# Patient Record
Sex: FEMALE | Race: WHITE | NOT HISPANIC OR LATINO | Employment: OTHER | URBAN - METROPOLITAN AREA
[De-identification: names, ages, dates, MRNs, and addresses within clinical notes are randomized per-mention and may not be internally consistent; named-entity substitution may affect disease eponyms.]

---

## 2023-11-12 ENCOUNTER — HOSPITAL ENCOUNTER (EMERGENCY)
Facility: HOSPITAL | Age: 88
Discharge: NURSING, REHAB OR LONG-TERM CARE | End: 2023-11-12
Attending: EMERGENCY MEDICINE | Admitting: EMERGENCY MEDICINE
Payer: MEDICARE

## 2023-11-12 ENCOUNTER — APPOINTMENT (OUTPATIENT)
Dept: CT IMAGING | Facility: HOSPITAL | Age: 88
End: 2023-11-12
Payer: MEDICARE

## 2023-11-12 ENCOUNTER — APPOINTMENT (OUTPATIENT)
Dept: GENERAL RADIOLOGY | Facility: HOSPITAL | Age: 88
End: 2023-11-12
Payer: MEDICARE

## 2023-11-12 VITALS
TEMPERATURE: 98.1 F | DIASTOLIC BLOOD PRESSURE: 83 MMHG | HEIGHT: 66 IN | RESPIRATION RATE: 18 BRPM | HEART RATE: 59 BPM | WEIGHT: 125 LBS | BODY MASS INDEX: 20.09 KG/M2 | SYSTOLIC BLOOD PRESSURE: 186 MMHG | OXYGEN SATURATION: 97 %

## 2023-11-12 DIAGNOSIS — S82.141A CLOSED FRACTURE OF RIGHT TIBIAL PLATEAU, INITIAL ENCOUNTER: Primary | ICD-10-CM

## 2023-11-12 LAB
ANION GAP SERPL CALCULATED.3IONS-SCNC: 8 MMOL/L (ref 5–15)
BASOPHILS # BLD AUTO: 0.04 10*3/MM3 (ref 0–0.2)
BASOPHILS NFR BLD AUTO: 0.7 % (ref 0–1.5)
BUN SERPL-MCNC: 12 MG/DL (ref 8–23)
BUN/CREAT SERPL: 21.1 (ref 7–25)
CALCIUM SPEC-SCNC: 8.5 MG/DL (ref 8.2–9.6)
CHLORIDE SERPL-SCNC: 107 MMOL/L (ref 98–107)
CO2 SERPL-SCNC: 27 MMOL/L (ref 22–29)
CREAT SERPL-MCNC: 0.57 MG/DL (ref 0.57–1)
DEPRECATED RDW RBC AUTO: 51.3 FL (ref 37–54)
EGFRCR SERPLBLD CKD-EPI 2021: 84.9 ML/MIN/1.73
EOSINOPHIL # BLD AUTO: 0.13 10*3/MM3 (ref 0–0.4)
EOSINOPHIL NFR BLD AUTO: 2.2 % (ref 0.3–6.2)
ERYTHROCYTE [DISTWIDTH] IN BLOOD BY AUTOMATED COUNT: 14.6 % (ref 12.3–15.4)
GLUCOSE SERPL-MCNC: 108 MG/DL (ref 65–99)
HCT VFR BLD AUTO: 37.6 % (ref 34–46.6)
HGB BLD-MCNC: 12.3 G/DL (ref 12–15.9)
IMM GRANULOCYTES # BLD AUTO: 0.01 10*3/MM3 (ref 0–0.05)
IMM GRANULOCYTES NFR BLD AUTO: 0.2 % (ref 0–0.5)
INR PPP: 1.38 (ref 0.89–1.12)
LYMPHOCYTES # BLD AUTO: 2.25 10*3/MM3 (ref 0.7–3.1)
LYMPHOCYTES NFR BLD AUTO: 37.4 % (ref 19.6–45.3)
MCH RBC QN AUTO: 31.5 PG (ref 26.6–33)
MCHC RBC AUTO-ENTMCNC: 32.7 G/DL (ref 31.5–35.7)
MCV RBC AUTO: 96.4 FL (ref 79–97)
MONOCYTES # BLD AUTO: 0.47 10*3/MM3 (ref 0.1–0.9)
MONOCYTES NFR BLD AUTO: 7.8 % (ref 5–12)
NEUTROPHILS NFR BLD AUTO: 3.11 10*3/MM3 (ref 1.7–7)
NEUTROPHILS NFR BLD AUTO: 51.7 % (ref 42.7–76)
NRBC BLD AUTO-RTO: 0 /100 WBC (ref 0–0.2)
PLATELET # BLD AUTO: 249 10*3/MM3 (ref 140–450)
PMV BLD AUTO: 10 FL (ref 6–12)
POTASSIUM SERPL-SCNC: 3.6 MMOL/L (ref 3.5–5.2)
PROTHROMBIN TIME: 17.1 SECONDS (ref 12.2–14.5)
RBC # BLD AUTO: 3.9 10*6/MM3 (ref 3.77–5.28)
SODIUM SERPL-SCNC: 142 MMOL/L (ref 136–145)
WBC NRBC COR # BLD: 6.01 10*3/MM3 (ref 3.4–10.8)

## 2023-11-12 PROCEDURE — 99284 EMERGENCY DEPT VISIT MOD MDM: CPT

## 2023-11-12 PROCEDURE — 36415 COLL VENOUS BLD VENIPUNCTURE: CPT

## 2023-11-12 PROCEDURE — 85025 COMPLETE CBC W/AUTO DIFF WBC: CPT | Performed by: EMERGENCY MEDICINE

## 2023-11-12 PROCEDURE — 73560 X-RAY EXAM OF KNEE 1 OR 2: CPT

## 2023-11-12 PROCEDURE — 80048 BASIC METABOLIC PNL TOTAL CA: CPT | Performed by: EMERGENCY MEDICINE

## 2023-11-12 PROCEDURE — 73700 CT LOWER EXTREMITY W/O DYE: CPT

## 2023-11-12 PROCEDURE — 73502 X-RAY EXAM HIP UNI 2-3 VIEWS: CPT

## 2023-11-12 PROCEDURE — 85610 PROTHROMBIN TIME: CPT | Performed by: EMERGENCY MEDICINE

## 2023-11-12 RX ORDER — HYDROCODONE BITARTRATE AND ACETAMINOPHEN 5; 325 MG/1; MG/1
1 TABLET ORAL ONCE
Status: COMPLETED | OUTPATIENT
Start: 2023-11-12 | End: 2023-11-12

## 2023-11-12 RX ADMIN — HYDROCODONE BITARTRATE AND ACETAMINOPHEN 1 TABLET: 5; 325 TABLET ORAL at 15:05

## 2023-11-12 NOTE — ED PROVIDER NOTES
Subjective   History of Present Illness    Pt presents after a fall.  She was trying to help her  out of bed and they both fell down.  [place] [facility] [place]  She says she isn't supposed to help him but they called for assistance and nobody came so she tried to help him up since he'd try to get up on his own.    She has pain to the right knee and milder pain to the right hip since falling. She denies hitting her head and denies pain to the head, neck, back, torso or arms.  She is anticoagulated.    She denies having any dizziness or acute illness symptoms leading to her fall.    History provided by:  Patient      Review of Systems   Constitutional:  Negative for fever.   Neurological:  Negative for dizziness and weakness.   All other systems reviewed and are negative.      Past Medical History:   Diagnosis Date    Atrial fibrillation     Hypertension     Pulmonary embolism        No Known Allergies    No past surgical history on file.    No family history on file.    Social History     Socioeconomic History    Marital status:    Tobacco Use    Smoking status: Former     Types: Cigarettes     Quit date:      Years since quittin.8   Substance and Sexual Activity    Alcohol use: No    Drug use: No           Objective   Physical Exam  Vitals and nursing note reviewed.   Constitutional:       General: She is not in acute distress.     Appearance: Normal appearance. She is not ill-appearing.   HENT:      Head: Normocephalic and atraumatic.   Eyes:      General: No scleral icterus.        Right eye: No discharge.         Left eye: No discharge.      Conjunctiva/sclera: Conjunctivae normal.   Cardiovascular:      Rate and Rhythm: Normal rate.   Pulmonary:      Effort: Pulmonary effort is normal. No respiratory distress.   Musculoskeletal:         General: Swelling present. No deformity.      Cervical back: Normal range of motion and neck supple. No tenderness.      Comments: There is a  hematoma on the right leg just inferior to the knee.  Very tender.  Knee ROM is limited by pain but she can resist gravity.  The lower leg is normal otherwise with good color and intact distal pulses and normal sensation and motor in the toes.    There is mild anterior hip tenderness.  ROM not tested pending imaging.    Except as documented there is no tenderness to gross palpation of the arms or legs, and intact painless ROM to the shoulders, elbows, wrists, hips, knees and ankles.   Skin:     General: Skin is dry.      Findings: No rash.   Neurological:      General: No focal deficit present.      Mental Status: She is alert and oriented to person, place, and time. Mental status is at baseline.   Psychiatric:         Mood and Affect: Mood normal.         Behavior: Behavior normal.         Thought Content: Thought content normal.         Procedures           ED Course    I reviewed the nursing home records sent with her with an XR report declaring an avulsion fracture of the proximal tibia.    XR here by my read shows a tibial plateau fracture.  Radiology report is equivocal.  CT ordered which confirms fracture.    I discussed findings and history with ortho Dr Prado.    Pt at baseline lives in NH with her  and is already wheelchair dependent.  She is anticoagulated.  Surgery would be a risk for her.  For today we will place a knee immobilizer and she will see ortho in the office.    She says she has pain meds prn at the NH and declined rx here.    Patient stable on serial rechecks.  Discussed findings, concerns, plan of care, expected course, reasons to return and followup.  Provided the opportunity to ask questions.                                         Medical Decision Making  Problems Addressed:  Closed fracture of right tibial plateau, initial encounter: complicated acute illness or injury    Amount and/or Complexity of Data Reviewed  External Data Reviewed: notes.     Details: Nursing home  Labs:  ordered. Decision-making details documented in ED Course.  Radiology: ordered and independent interpretation performed. Decision-making details documented in ED Course.     Details: XR my read: tibial plateau fracture    Risk  Prescription drug management.  Decision regarding hospitalization.        Final diagnoses:   Closed fracture of right tibial plateau, initial encounter       ED Disposition  ED Disposition       ED Disposition   Discharge    Condition   Stable    Comment   --               Navarro Prado MD  [address] [address]  [phone]    Call in 1 day           Medication List      No changes were made to your prescriptions during this visit.            Elmer Dumont MD  11/12/23 5939

## 2023-11-16 ENCOUNTER — OFFICE VISIT (OUTPATIENT)
Dept: ORTHOPEDIC SURGERY | Facility: CLINIC | Age: 88
End: 2023-11-16
Payer: MEDICARE

## 2023-11-16 DIAGNOSIS — S82.131A CLOSED FRACTURE OF MEDIAL PORTION OF RIGHT TIBIAL PLATEAU, INITIAL ENCOUNTER: Primary | ICD-10-CM

## 2023-11-16 RX ORDER — PSEUDOEPHEDRINE HCL 30 MG
TABLET ORAL
COMMUNITY

## 2023-11-16 RX ORDER — ONDANSETRON 4 MG/1
4 TABLET, FILM COATED ORAL
COMMUNITY

## 2023-11-16 RX ORDER — LIFITEGRAST 50 MG/ML
1 SOLUTION/ DROPS OPHTHALMIC 2 TIMES DAILY
COMMUNITY
Start: 2023-10-31

## 2023-11-16 RX ORDER — RIVAROXABAN 20 MG/1
20 TABLET, FILM COATED ORAL
COMMUNITY

## 2023-11-16 RX ORDER — BACLOFEN 5 MG/1
5 TABLET ORAL
COMMUNITY
Start: 2023-11-13

## 2023-11-16 RX ORDER — OMEPRAZOLE 20 MG/1
20 CAPSULE, DELAYED RELEASE ORAL
COMMUNITY
Start: 2023-10-31

## 2023-11-16 RX ORDER — SERTRALINE HYDROCHLORIDE 100 MG/1
100 TABLET, FILM COATED ORAL
COMMUNITY
Start: 2023-11-03

## 2023-11-16 RX ORDER — FLUTICASONE PROPIONATE 50 MCG
SPRAY, SUSPENSION (ML) NASAL
COMMUNITY
Start: 2023-11-08

## 2023-11-16 RX ORDER — NITROGLYCERIN 0.4 MG/1
0.4 TABLET SUBLINGUAL
COMMUNITY

## 2023-11-16 RX ORDER — NYSTATIN 100000 [USP'U]/G
POWDER TOPICAL
COMMUNITY
Start: 2023-10-24

## 2023-11-16 RX ORDER — CLONIDINE HYDROCHLORIDE 0.1 MG/1
0.1 TABLET ORAL
COMMUNITY
Start: 2023-10-16

## 2023-11-16 RX ORDER — OXYCODONE HYDROCHLORIDE 5 MG/1
5 TABLET ORAL
COMMUNITY
Start: 2023-10-17

## 2023-11-16 RX ORDER — TRAZODONE HYDROCHLORIDE 50 MG/1
50 TABLET ORAL
COMMUNITY
Start: 2023-11-15

## 2023-11-16 NOTE — PROGRESS NOTES
[facility] Note        Subjective     Pain of the Right Knee      HPI    Lisa Green is a 89 y.o. female.  Patient is here with her daughter today for evaluation of her right knee.  She has had a hip fracture in the past.  She essentially is bed to chair and chair to bed and spends most of her time in a wheelchair.  She hit a another wheelchair and fell.  She is here for evaluation treatment after going to the [facility] ER.          Past Medical History:   Diagnosis Date    Atrial fibrillation     Fracture, tibia and fibula     Hypertension     Pulmonary embolism       History reviewed. No pertinent surgical history.   History reviewed. No pertinent family history.  Social History     Socioeconomic History    Marital status:    Tobacco Use    Smoking status: Former     Types: Cigarettes     Quit date:      Years since quittin.8   Vaping Use    Vaping Use: Never used   Substance and Sexual Activity    Alcohol use: No    Drug use: No      Current Outpatient Medications on File Prior to Visit   Medication Sig Dispense Refill    amLODIPine (NORVASC) 2.5 MG tablet Take 1 tablet by mouth Daily.      Baclofen (LIORESAL) 5 MG tablet 1 tablet.      cloNIDine (CATAPRES) 0.1 MG tablet 1 tablet.      docusate sodium 250 MG capsule Take 1 capsule every day by oral route.      fluticasone (FLONASE) 50 MCG/ACT nasal spray       nitroglycerin (NITROSTAT) 0.4 MG SL tablet Place 1 tablet under the tongue Every 5 (Five) Minutes As Needed for Chest Pain. Take no more than 3 doses in 15 minutes.      nystatin (MYCOSTATIN) 191800 UNIT/GM powder       omeprazole (priLOSEC) 20 MG capsule 1 capsule.      ondansetron (Zofran) 4 MG tablet 1 tablet.      oxyCODONE (ROXICODONE) 5 MG immediate release tablet 1 tablet.      sertraline (ZOLOFT) 100 MG tablet 1 tablet.      traZODone (DESYREL) 50 MG tablet 1 tablet.      Xarelto 20 MG tablet 1 tablet.      Xiidra 5 % ophthalmic solution Administer 1 drop  to both eyes 2 (Two) Times a Day.      [DISCONTINUED] omeprazole (PriLOSEC) 40 MG capsule Take 40 mg by mouth Daily.      [DISCONTINUED] warfarin (COUMADIN) 1 MG tablet Take 1 mg by mouth 4 (Four) Times a Week.      [DISCONTINUED] warfarin (COUMADIN) 3 MG tablet Take 3 mg by mouth 3 (Three) Times a Week.       No current facility-administered medications on file prior to visit.      No Known Allergies       Review of Systems   Constitutional: Negative.    HENT: Negative.     Eyes: Negative.    Respiratory: Negative.     Cardiovascular: Negative.    Gastrointestinal: Negative.    Endocrine: Negative.    Genitourinary: Negative.    Musculoskeletal:  Positive for arthralgias.   Skin: Negative.    Allergic/Immunologic: Negative.    Neurological: Negative.    Hematological: Negative.    Psychiatric/Behavioral: Negative.          I reviewed the patient's chief complaint, history of present illness, review of systems, past medical history, surgical history, family history, social history, medications and allergy list.        Objective      Physical Exam  There were no vitals taken for this visit.    There is no height or weight on file to calculate BMI.    General  Mental Status - alert  General Appearance - cooperative, well groomed, not in acute distress  Orientation - Oriented X3  Build & Nutrition - well developed and well nourished  Posture - normal posture  Gait -wheelchair       Ortho Exam  Patient is able to do straight leg raise.  There is a bit of a flexion contracture.  Patient does not passively get fully straight either.  There is ecchymosis at the medial tibial plateau.  She is tender to palpation along the medial tibia.  The knee is grossly stable to varus and valgus force at 0 and 30 degrees.  Pain with testing.    Imaging/Studies  Imaging Results (Last 24 Hours)       ** No results found for the last 24 hours. **          CT Lower Extremity Right Without Contrast  Narrative: CT LOWER EXTREMITY RIGHT WO  CONTRAST    Date of Exam: 11/12/2023 1:29 PM EST    Indication: right knee - patella and proximal tibia injuries.    Comparison: Right knee radiographs from the same day    Technique: Axial CT images were obtained of the right lower extremity without contrast administration.  Reconstructed coronal and sagittal images were also obtained. Automated exposure control and iterative construction methods were used.    Findings:  Bones are demineralized. There is a minimally displaced fracture of the proximal tibia involving the medial tibial plateau with articular step-off deformity measuring up to 3 mm. There are moderate osteoarthritic changes of the knee. Moderate   lipohemarthrosis is present. Soft tissue edema is present. Meniscal chondrocalcinosis is noted.  Impression: Impression:  1.Proximal tibial fracture involving the medial tibial plateau.  2.Moderate lipohemarthrosis.  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  4.Meniscal chondrocalcinosis, suggesting CPPD deposition disease.    Electronically Signed: Rigoberto Hernandes MD    11/12/2023 1:50 PM EST    Workstation ID: [id number]  XR Hip With or Without Pelvis 2 - 3 View Right  Narrative: XR HIP W OR WO PELVIS 2-3 VIEW RIGHT    Date of Exam: 11/12/2023 12:52 PM EST    Indication: hip pain after fall    Comparison: None available.    Findings:  The patient had a left hip arthroplasty. There is some narrowing of the right hip joint. There is osseous demineralization. There is no acute abnormality of the right hip. The pelvic bones seem intact.  Impression: Impression:  1.Osseous demineralization.  2.An acute right hip abnormality is not apparent.    Electronically Signed: Enoc Schwab MD    11/12/2023 1:12 PM EST    Workstation ID: [id number]  XR Knee 1 or 2 View Right  Narrative: XR KNEE 1 OR 2 VW RIGHT    Date of Exam: 11/12/2023 12:52 PM EST    Indication: right knee pain after fall    Comparison: None available.    Findings:  There is a  suprapatellar bursal joint effusion. There is chondrocalcinosis. There is limited deformity of the medial malleolar area. This may be more chronic and degenerative in nature. It is hard to appreciate a fracture line. There is osseous   demineralization. There is an enthesophyte along the superior pole patella.  Impression: Impression:  1.Suprapatellar bursal joint effusion suggested.  2.Osseous demineralization.  3.Chondrocalcinosis.  4.Slight deformity of the medial malleolus area that may be more chronic. The need for any additional work-up should be based on clinical findings.    Electronically Signed: Enoc Schwab MD    11/12/2023 1:09 PM EST    Workstation ID: [id number]       We have reviewed images and report of the x-ray above and also a CT scan done on the same day as a plain film.  Patient appears to have a mildly displaced medial tibial plateau fracture.  Quadricep tendon grossly appears to be intact without any type of avulsion.    Assessment    Assessment:  1. Closed fracture of medial portion of right tibial plateau, initial encounter        Plan:  Continue over-the-counter medication as needed for discomfort  89-year-old minimal ambulator with history of falls who has sustained a ground-level fall and an acute medial tibial plateau fracture--plan will be for nonoperative treatment.  Do not see that this would benefit her in we would not allow earlier weightbearing.  She is a minimal ambulator at her baseline.  We will put her into a hinged T ROM brace.  She can unlock the brace for sitting but she needs to lock the brace in full extension for transfers only.  See her back in 2 weeks with 2 views of her right knee.  We will allow more weightbearing once we see radiographic evidence of healing.        Navarro Prado MD  11/16/23  13:45 EST      Dictated Utilizing Dragon Dictation.

## 2023-11-30 ENCOUNTER — OFFICE VISIT (OUTPATIENT)
Dept: ORTHOPEDIC SURGERY | Facility: CLINIC | Age: 88
End: 2023-11-30
Payer: MEDICARE

## 2023-11-30 VITALS
BODY MASS INDEX: 20.09 KG/M2 | HEIGHT: 66 IN | SYSTOLIC BLOOD PRESSURE: 132 MMHG | WEIGHT: 125 LBS | DIASTOLIC BLOOD PRESSURE: 68 MMHG

## 2023-11-30 DIAGNOSIS — S82.131D CLOSED FRACTURE OF MEDIAL PORTION OF RIGHT TIBIAL PLATEAU WITH ROUTINE HEALING, SUBSEQUENT ENCOUNTER: Primary | ICD-10-CM

## 2023-11-30 RX ORDER — PROPRANOLOL HYDROCHLORIDE 80 MG/1
CAPSULE, EXTENDED RELEASE ORAL DAILY
COMMUNITY

## 2023-11-30 NOTE — PROGRESS NOTES
"    [facility] [place] [place]        Subjective     CC: Follow-up (2 week follow up -- Closed fracture of medial portion of right tibial plateau)      LEXI Green is a 89 y.o. female.  Patient returns with her daughter today for follow-up of her right medial tibial plateau fracture.  She was placed in a hinged T ROM brace at her last visit but tells me that she took it off because she could not tolerate it and caused her leg to bruise.  Patient tells me that she is able to sit without difficulty or discomfort.    Overall, patient's symptoms are as above.  Patient's daughter tells me in confidence that the patient has been more confused as of late.  She is taking pain medication for her injury.  Has a history of UTIs.    ROS:    Constiutional:Pt denies fever, chills, nausea, or vomiting.  MSK:as above        Objective      Past Medical History  Past Medical History:   Diagnosis Date    Atrial fibrillation     Fracture, tibia and fibula     Hypertension     Pulmonary embolism      Social History     Socioeconomic History    Marital status:    Tobacco Use    Smoking status: Former     Types: Cigarettes     Quit date:      Years since quittin.9   Vaping Use    Vaping Use: Never used   Substance and Sexual Activity    Alcohol use: No    Drug use: No          Physical Exam  /68   Ht 167.6 cm (66\")   Wt 56.7 kg (125 lb)   BMI 20.18 kg/m²     Body mass index is 20.18 kg/m².    Patient is well nourished and well developed.        Ortho Exam  Range of motion is 20 to about 95 degrees.  Mild medial joint line tenderness.  No gross instability to varus or valgus force at 30 degrees.    Imaging/Labs/EMG Reviewed:  Imaging Results (Last 24 Hours)       Procedure Component Value Units Date/Time    XR Knee 1 or 2 View Right [[id number]] Resulted: 23 1500     Updated: 23 1501    Narrative:      Knee X-Ray    Indication: Pain    Study:   AP and Lateral  views of Right " knee(s)    Comparison: Right knee 11/12/2023    Findings:  When compared to the prior study, there has been a small amount of   collapse of the medial tibial plateau fracture.  Overall alignment is   still acceptable.  Patient appears to have minimal degenerative changes noted in the medial,   lateral, and patellofemoral compartments.   Normal soft tissues  Minimal knee effusion noted  No bony lesions  Normal alignment     Impression:   Mildly displaced medial tibial plateau fracture with mild interval   displacement.                Assessment    Assessment:  1. Closed fracture of medial portion of right tibial plateau with routine healing, subsequent encounter        Plan:  Recommend over the counter anti-inflammatories for pain and/or swelling  Medial tibial plateau fracture right knee--patient is taking her brace off.  We will ask her physical therapist at [facility] to work on range of motion so when she has healed sufficiently to the point where we would feel confident letting her bear weight, she can do so without a significant flexion contracture of the right knee.  See her back in 2 to 3 weeks with an x-ray of her knee and we will reassess range of motion of the right knee.      Navarro Prado MD  11/30/23  16:33 EST      Dictated Utilizing Dragon Dictation.

## 2023-12-14 ENCOUNTER — OFFICE VISIT (OUTPATIENT)
Dept: ORTHOPEDIC SURGERY | Facility: CLINIC | Age: 88
End: 2023-12-14
Payer: MEDICARE

## 2023-12-14 VITALS
WEIGHT: 123 LBS | DIASTOLIC BLOOD PRESSURE: 68 MMHG | BODY MASS INDEX: 19.77 KG/M2 | SYSTOLIC BLOOD PRESSURE: 120 MMHG | HEIGHT: 66 IN

## 2023-12-14 DIAGNOSIS — S82.131D CLOSED FRACTURE OF MEDIAL PORTION OF RIGHT TIBIAL PLATEAU WITH ROUTINE HEALING, SUBSEQUENT ENCOUNTER: ICD-10-CM

## 2023-12-14 NOTE — PROGRESS NOTES
"    [facility] [place] [facility] [place]        Subjective     CC: Follow-up (2 week f/u; Closed fracture of medial portion of right tibial plateau with routine healing)      LEXI Green is a 89 y.o. female.  Patient returns today for follow-up of right knee medial tibial plateau fracture.  No new complaints or issues.    Pain scale: 5/10.  Only time she is attempting to weight-bear as with transfers. TROM--not wearing due to discomfort.  She had been offered a hinged knee brace at last visit and chose not to wear it either.  She is working with physical therapy at [facility].    Overall, patient's symptoms are as above.    ROS:    Constiutional:Pt denies fever, chills, nausea, or vomiting.  MSK:as above        Objective      Past Medical History  Past Medical History:   Diagnosis Date    Atrial fibrillation     Fracture, tibia and fibula     Hypertension     Pulmonary embolism      Social History     Socioeconomic History    Marital status:    Tobacco Use    Smoking status: Former     Types: Cigarettes     Quit date:      Years since quittin.9   Vaping Use    Vaping Use: Never used   Substance and Sexual Activity    Alcohol use: No    Drug use: No          Physical Exam  /68   Ht 167.6 cm (66\")   Wt 55.8 kg (123 lb)   BMI 19.85 kg/m²     Body mass index is 19.85 kg/m².    Patient is well nourished and well developed.     Predominantly wheelchair-bound.      Ortho Exam  Right knee  Skin: Intact without any erythema, warmth, swelling or evidence of infection.  Motion: Remains 15/20 to 100°.  Tenderness: Minimal medial joint pain tenderness.  Passive range of motion does not cause any pain.  Straight leg raise: Intact.  Motor/sensory: Grossly intact L2-S1.       Imaging/Labs/EMG Reviewed:  Ordered right knee plain films.  Imaging read/interpreted by Dr. Prado.    Imaging Results (Last 24 Hours)       Procedure Component Value Units Date/Time    XR Knee 1 or 2 View " Right [[id number]] Resulted: 12/14/23 1506     Updated: 12/14/23 1507    Narrative:      Knee X-Ray    Indication: Pain    Study:   AP and Lateral  views of Right knee(s)    Comparison: Right knee 11/30/2023    Findings:  The displaced fracture of the medial tibial plateau is stable in   appearance.  New bone is visible.  Patient appears to have minimal degenerative changes noted in the medial,   lateral, and patellofemoral compartments.   Normal soft tissues  Minimal knee effusion noted  No bony lesions  Normal alignment     Impression:   Stable appearance right medial tibial plateau fracture with interval   healing.              Assessment:  1. Closed fracture of medial portion of right tibial plateau with routine healing, subsequent encounter        Plan:  Right knee medial tibial plateau fracture, stable and healing.  Reviewed imaging with the patient and daughter.  [place] [facility]  Recommend OTC pain medication as needed.  To remain nonweightbearing for the next 2 weeks.  After those 2 weeks may weight-bear for transfers but must use a walker.  Follow up in 6 weeks for repeat evaluation, will need repeat imaging of right knee.  Questions and concerns answered.      Sita Paulino PA-C  12/15/23  11:24 EST      Dictated Utilizing Dragon Dictation.

## 2024-01-25 ENCOUNTER — OFFICE VISIT (OUTPATIENT)
Dept: ORTHOPEDIC SURGERY | Facility: CLINIC | Age: 89
End: 2024-01-25
Payer: MEDICARE

## 2024-01-25 VITALS
WEIGHT: 123.02 LBS | HEIGHT: 66 IN | SYSTOLIC BLOOD PRESSURE: 128 MMHG | DIASTOLIC BLOOD PRESSURE: 60 MMHG | BODY MASS INDEX: 19.77 KG/M2

## 2024-01-25 DIAGNOSIS — S82.131D CLOSED FRACTURE OF MEDIAL PORTION OF RIGHT TIBIAL PLATEAU WITH ROUTINE HEALING, SUBSEQUENT ENCOUNTER: Primary | ICD-10-CM

## 2024-01-25 PROCEDURE — 99213 OFFICE O/P EST LOW 20 MIN: CPT | Performed by: PHYSICIAN ASSISTANT

## 2024-01-25 PROCEDURE — 1160F RVW MEDS BY RX/DR IN RCRD: CPT | Performed by: PHYSICIAN ASSISTANT

## 2024-01-25 PROCEDURE — 1159F MED LIST DOCD IN RCRD: CPT | Performed by: PHYSICIAN ASSISTANT

## 2024-01-25 NOTE — PROGRESS NOTES
"    [facility] [place] [facility] [place]        Subjective     CC: Follow-up (6 week follow up; Closed fracture of medial portion of right tibial plateau with routine healing (DOI: 23))      LEXI Green is a 89 y.o. female.  Patient returns today for follow-up evaluation right knee medial tibial plateau fracture, treated nonoperatively.  No new issues or concerns.      Pain scale max 3/10.  She is typically in a wheelchair but when she does ambulate/weight bear with walker.  She is been attending PT at [facility].    Overall, patient's symptoms are better.    ROS:    Constiutional:Pt denies fever, chills, nausea, or vomiting.  MSK:as above        Objective      Past Medical History  Past Medical History:   Diagnosis Date    Atrial fibrillation     Fracture, tibia and fibula     Hypertension     Pulmonary embolism      Social History     Socioeconomic History    Marital status:    Tobacco Use    Smoking status: Former     Types: Cigarettes     Quit date:      Years since quittin.1   Vaping Use    Vaping Use: Never used   Substance and Sexual Activity    Alcohol use: No    Drug use: No    Sexual activity: Defer          Physical Exam  /60   Ht 167.6 cm (65.98\")   Wt 55.8 kg (123 lb 0.3 oz)   BMI 19.87 kg/m²     Body mass index is 19.87 kg/m².    Patient is well nourished and well developed.        Ortho Exam  Right knee  Skin: Intact without any erythema, warmth, swelling or evidence of infection.  Motion: Remains °.  Tenderness: No significant pain on exam today.  Patient describes mild discomfort.  Passive range of motion does not cause any pain.  Straight leg raise: Intact.  Motor/sensory: Grossly intact L2-S1.       Imaging/Labs/EMG Reviewed:  Ordered right knee plain films.  Imaging read/interpreted by Dr. Prado.    Knee X-Ray     Indication: Pain     Study:   AP and Lateral  views of Right knee(s)     Comparison: Right knee 2023   "   Findings:  Compared to the prior study, patient appears to have consolidated the medial tibial plateau fracture.  Alignment is not grossly different compared to the prior study.  Patient appears to have minimal degenerative changes noted in the medial, lateral, and patellofemoral compartments.   Normal soft tissues  Minimal knee effusion noted  No bony lesions  Normal alignment      Impression:   Healed right medial tibial plateau fracture.      Assessment:  1. Closed fracture of medial portion of right tibial plateau with routine healing, subsequent encounter        Plan:  Right knee medial tibial plateau fracture, healed.  Reviewed imaging with the patient and daughter.  Continue with [place] at [facility].  Recommend OTC pain medication as needed.  Weightbearing as tolerated but needs to continue using a walker to assist with ambulation as needed.  Follow up as needed.  Questions and concerns answered.      Sita Paulino PA-C  01/30/24  11:52 EST      Dictated Utilizing Dragon Dictation.

## 2024-09-18 ENCOUNTER — HOSPITAL ENCOUNTER (EMERGENCY)
Facility: HOSPITAL | Age: 89
Discharge: HOME | End: 2024-09-18
Attending: EMERGENCY MEDICINE
Payer: MEDICARE

## 2024-09-18 ENCOUNTER — APPOINTMENT (OUTPATIENT)
Dept: GENERAL RADIOLOGY | Facility: HOSPITAL | Age: 89
End: 2024-09-18
Payer: MEDICARE

## 2024-09-18 VITALS
SYSTOLIC BLOOD PRESSURE: 180 MMHG | WEIGHT: 129.3 LBS | TEMPERATURE: 97.8 F | HEART RATE: 66 BPM | BODY MASS INDEX: 20.78 KG/M2 | RESPIRATION RATE: 18 BRPM | DIASTOLIC BLOOD PRESSURE: 87 MMHG | HEIGHT: 66 IN | OXYGEN SATURATION: 96 %
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
## 2024-09-18 DIAGNOSIS — M25.552 PAIN OF LEFT HIP: ICD-10-CM

## 2024-09-18 LAB
HOLD SPECIMEN: NORMAL
WHOLE BLOOD HOLD COAG: NORMAL
WHOLE BLOOD HOLD SPECIMEN: NORMAL

## 2024-09-18 PROCEDURE — 73502 X-RAY EXAM HIP UNI 2-3 VIEWS: CPT

## 2024-09-18 PROCEDURE — 99283 EMERGENCY DEPT VISIT LOW MDM: CPT

## 2024-11-02 ENCOUNTER — APPOINTMENT (OUTPATIENT)
Dept: GENERAL RADIOLOGY | Facility: HOSPITAL | Age: 89
End: 2024-11-02
Payer: MEDICARE

## 2024-11-02 ENCOUNTER — HOSPITAL ENCOUNTER (EMERGENCY)
Facility: HOSPITAL | Age: 89
Discharge: HOME | End: 2024-11-02
Attending: EMERGENCY MEDICINE
Payer: MEDICARE

## 2024-11-02 ENCOUNTER — APPOINTMENT (OUTPATIENT)
Dept: CT IMAGING | Facility: HOSPITAL | Age: 89
End: 2024-11-02
Payer: MEDICARE

## 2024-11-02 VITALS
TEMPERATURE: 97.5 F | OXYGEN SATURATION: 95 % | HEART RATE: 58 BPM | DIASTOLIC BLOOD PRESSURE: 71 MMHG | SYSTOLIC BLOOD PRESSURE: 128 MMHG | HEIGHT: 66 IN | WEIGHT: 129.41 LBS | RESPIRATION RATE: 20 BRPM | BODY MASS INDEX: 20.8 KG/M2

## 2024-11-02 DIAGNOSIS — S01.01XA SCALP LACERATION, INITIAL ENCOUNTER: ICD-10-CM

## 2024-11-02 DIAGNOSIS — M25.552 ACUTE HIP PAIN, LEFT: ICD-10-CM
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
## 2024-11-02 DIAGNOSIS — S00.03XA SCALP HEMATOMA, INITIAL ENCOUNTER: Primary | ICD-10-CM

## 2024-11-02 PROCEDURE — 25010000002 LIDOCAINE PF 1% 1 % SOLUTION: Performed by: EMERGENCY MEDICINE

## 2024-11-02 PROCEDURE — 70450 CT HEAD/BRAIN W/O DYE: CPT

## 2024-11-02 PROCEDURE — 90471 IMMUNIZATION ADMIN: CPT | Performed by: EMERGENCY MEDICINE

## 2024-11-02 PROCEDURE — 25010000002 TETANUS-DIPHTH-ACELL PERTUSSIS 5-2.5-18.5 LF-MCG/0.5 SUSPENSION PREFILLED SYRINGE: Performed by: EMERGENCY MEDICINE

## 2024-11-02 PROCEDURE — 72125 CT NECK SPINE W/O DYE: CPT

## 2024-11-02 PROCEDURE — 90715 TDAP VACCINE 7 YRS/> IM: CPT | Performed by: EMERGENCY MEDICINE

## 2024-11-02 PROCEDURE — 73502 X-RAY EXAM HIP UNI 2-3 VIEWS: CPT

## 2024-11-02 PROCEDURE — 99284 EMERGENCY DEPT VISIT MOD MDM: CPT

## 2024-11-02 RX ORDER — LIDOCAINE HYDROCHLORIDE 10 MG/ML
10 INJECTION, SOLUTION EPIDURAL; INFILTRATION; INTRACAUDAL; PERINEURAL ONCE
Status: COMPLETED | OUTPATIENT
Start: 2024-11-02 | End: 2024-11-02

## 2024-11-02 RX ADMIN — TETANUS TOXOID, REDUCED DIPHTHERIA TOXOID AND ACELLULAR PERTUSSIS VACCINE, ADSORBED 0.5 ML: 5; 2.5; 8; 8; 2.5 SUSPENSION INTRAMUSCULAR at 04:53

## 2024-11-02 RX ADMIN — LIDOCAINE HYDROCHLORIDE 10 ML: 10 INJECTION, SOLUTION EPIDURAL; INFILTRATION; INTRACAUDAL; PERINEURAL at 04:36

## 2024-11-02 NOTE — DISCHARGE INSTRUCTIONS
Use extra caution to avoid falls.  You will need to have your staples in your head removed after 7 days.  Follow-up with a primary care doctor or return to the emergency room to have this conducted.  Return to the ER as needed for new or worsening symptoms

## 2024-11-02 NOTE — ED PROVIDER NOTES
[facility]    EMERGENCY DEPARTMENT ENCOUNTER      Pt Name: Lisa Green  MRN: [id number]  YOB: 1930  Date of evaluation: 11/2/2024  Provider: Rod Lopez MD    CHIEF COMPLAINT       Chief Complaint   Patient presents with    Fall         HISTORY OF PRESENT ILLNESS   Lisa Green is a 89 y.o. female who presents to the emergency department for evaluation of injuries after a fall.  Was getting up to use the bathroom in the middle of the night, stumbled over something and went to the ground striking her head.  She also has a headache, bleeding from her posterior scalp, pain in her left hip.  No numbness or weakness.    REVIEW OF SYSTEMS     ROS:  A chief complaint appropriate review of systems was completed and is negative except as noted in the HPI.      PAST MEDICAL HISTORY     Past Medical History:   Diagnosis Date    Atrial fibrillation     Fracture, tibia and fibula     Hypertension     Pulmonary embolism          SURGICAL HISTORY     History reviewed. No pertinent surgical history.      CURRENT MEDICATIONS     No current facility-administered medications for this encounter.    Current Outpatient Medications:     amLODIPine (NORVASC) 2.5 MG tablet, Take 1 tablet by mouth Daily., Disp: , Rfl:     Baclofen (LIORESAL) 5 MG tablet, 1 tablet., Disp: , Rfl:     cloNIDine (CATAPRES) 0.1 MG tablet, 1 tablet., Disp: , Rfl:     docusate sodium 250 MG capsule, Take 1 capsule every day by oral route., Disp: , Rfl:     fluticasone (FLONASE) 50 MCG/ACT nasal spray, , Disp: , Rfl:     nitroglycerin (NITROSTAT) 0.4 MG SL tablet, Place 1 tablet under the tongue Every 5 (Five) Minutes As Needed for Chest Pain. Take no more than 3 doses in 15 minutes., Disp: , Rfl:     nystatin (MYCOSTATIN) 676478 UNIT/GM powder, , Disp: , Rfl:     omeprazole (priLOSEC) 20 MG capsule, 1 capsule., Disp: , Rfl:     ondansetron (Zofran) 4 MG tablet, 1 tablet., Disp: , Rfl:     oxyCODONE (ROXICODONE) 5 MG immediate release tablet, 1  tablet., Disp: , Rfl:     propranolol LA (Inderal LA) 80 MG 24 hr capsule, Daily., Disp: , Rfl:     sertraline (ZOLOFT) 100 MG tablet, 1 tablet., Disp: , Rfl:     traZODone (DESYREL) 50 MG tablet, 1 tablet., Disp: , Rfl:     Xarelto 20 MG tablet, 1 tablet., Disp: , Rfl:     Xiidra 5 % ophthalmic solution, Administer 1 drop to both eyes 2 (Two) Times a Day., Disp: , Rfl:     ALLERGIES     Patient has no known allergies.    FAMILY HISTORY     History reviewed. No pertinent family history.       SOCIAL HISTORY       Social History     Socioeconomic History    Marital status:    Tobacco Use    Smoking status: Former     Current packs/day: 0.00     Types: Cigarettes     Quit date:      Years since quittin.8   Vaping Use    Vaping status: Never Used   Substance and Sexual Activity    Alcohol use: No    Drug use: No    Sexual activity: Defer         PHYSICAL EXAM    (up to 7 for level 4, 8 or more for level 5)     Vitals:    24 0518 24 0530 24 0540 24 0545   BP:  154/68     BP Location:       Patient Position:       Pulse: 56 51 52 52   Resp:       Temp:       TempSrc:       SpO2: 97% 96% 96% 98%   Weight:       Height:           Physical Exam  Constitutional:       General: She is not in acute distress.  HENT:      Head: Normocephalic.      Comments: Approximately 4 cm laceration to the right occipital scalp, bleeding controlled  Eyes:      Conjunctiva/sclera: Conjunctivae normal.      Pupils: Pupils are equal, round, and reactive to light.   Neck:      Comments: No tenderness to the cervical spine.  No step-offs or deformities.  Normal range of motion    Cardiovascular:      Rate and Rhythm: Normal rate and regular rhythm.      Pulses: Normal pulses.      Heart sounds: No murmur heard.     No gallop.   Pulmonary:      Effort: Pulmonary effort is normal. No respiratory distress.      Breath sounds: No wheezing or rales.   Chest:      Chest wall: No tenderness.   Abdominal:       General: Abdomen is flat. There is no distension.      Tenderness: There is no abdominal tenderness.   Musculoskeletal:         General: No swelling or deformity. Normal range of motion.      Comments: No tenderness to the thoracic or lumbar spine.  No step-offs or deformities.  Normal range of motion of the bilateral upper and lower extremities without pain     Skin:     General: Skin is warm and dry.      Capillary Refill: Capillary refill takes less than 2 seconds.   Neurological:      General: No focal deficit present.      Mental Status: She is alert and oriented to person, place, and time.      Comments: GCS 15.  Cranial Nerves II-XII intact without deficit.  Strength 5/5 in the bilateral upper extremities.  Strength 5/5 in the bilateral lower extremities.  Sensation to light touch intact throughout.        Psychiatric:         Mood and Affect: Mood normal.         Behavior: Behavior normal.            DIAGNOSTIC RESULTS     EKG: All EKGs are interpreted by the Emergency Department Physician who either signs or Co-signs this chart in the absence of a cardiologist.    No orders to display         RADIOLOGY:   [x] Radiologist's Report Reviewed:  XR Hip With or Without Pelvis 2 - 3 View Left   Final Result   Impression:   Postoperative changes from left hip arthroplasty. No acute fracture.            Electronically Signed: José Miguel Ferrara MD     11/2/2024 5:43 AM EDT     Workstation ID: [id number]      CT Head Without Contrast   Final Result   Impression:   Atrophy and chronic microvascular ischemic change. No acute intracranial process.            Electronically Signed: José Miguel Ferrara MD     11/2/2024 4:57 AM EDT     Workstation ID: [id number]      CT Cervical Spine Without Contrast   Final Result   Impression:   Moderate multilevel degenerative change. No acute fracture.            Electronically Signed: José Miguel Ferrara MD     11/2/2024 5:08 AM EDT     Workstation ID: [id number]          I ordered and independently reviewed  the above noted radiographic studies.        LABS:  I independently interpreted all laboratory studies conducted during this ED visit.  The results of these studies can be seen below and my independent interpretation in the ED course      EMERGENCY DEPARTMENT COURSE and DIFFERENTIAL DIAGNOSIS/MDM:   Vitals:  AS OF 05:50 EDT    BP - 154/68  HR - 52  TEMP - 97.5 °F (36.4 °C) (Oral)  O2 SATS - 98%        Discussion below represents my analysis of pertinent findings related to patient's condition, differential diagnosis, treatment plan and final disposition.      Differential diagnosis:  The differential diagnosis associated with the patient's presentation includes: Scalp laceration, hematoma, skull fracture, intracranial bleeding, hip fracture dislocation or contusion/soft tissue injury.      Independent interpretations (ECG/rhythm strip/X-ray/US/CT scan): See ED course      Additional sources:  Discussed/obtained information from independent historians:   [] Spouse:   [] Parent:   [] Friend:   [x] EMS: Prehospital course by EMS   [] Other:    External record review:  9/18/2024 reviewed most recent ER visit where patient was treated for pain in the left hip after a fall      Patient's care impacted by:   [] Diabetes   [] Hypertension   [] Coronary Artery Disease   [] Cancer   [x] Other: Anticoagulation based on history of PE and A-fib    Care significantly affected by Social Determinants of Health (housing and economic circumstances, unemployment)    [] Yes     [x] No   If yes, Patient's care significantly limited by  Social Determinants of Health including:    [] Inadequate housing    [] Low income    [] Alcoholism and drug addiction in family    [] Problems related to primary support group    [] Unemployment    [] Problems related to employment    [] Other Social Determinants of Health:       ED Course:    ED Course as of 11/02/24 0550   Sat Nov 02, 2024   0548 CT scan of the brain and cervical spine independently  interpreted by myself demonstrates no skull or cervical spine fracture, no acute intracranial bleeding [KB]   9950 Radiographs of the hip and pelvis independently interpreted by myself demonstrate no acute bony fracture or dislocation [KB]      ED Course User Index  [KB] Rod Lopez MD         Patient scalp laceration was repaired and she was ultimately discharged from the ER in good condition.    I had a discussion with the patient/family regarding diagnosis, diagnostic results, treatment plan, and medications.  The patient/family indicated understanding of these instructions.  I spent adequate time at the bedside preceding discharge necessary to personally discuss the aftercare instructions, giving patient education, providing explanations of the results of our evaluations/findings, and my decision making to assure that the patient/family understand the plan of care.  Time was allotted to answer questions at that time and throughout the ED course.       PROCEDURES:  Laceration Repair    Date/Time: 11/2/2024 5:49 AM    Performed by: Rod Lopez MD  Authorized by: Rod Lopez MD    Consent:     Consent obtained:  Verbal    Consent given by:  Patient    Risks discussed:  Infection, pain, poor cosmetic result, poor wound healing and retained foreign body  Universal protocol:     Test results available: yes      Imaging studies available: yes      Patient identity confirmed:  Verbally with patient and arm band  Anesthesia:     Anesthesia method:  Local infiltration    Local anesthetic:  Lidocaine 1% w/o epi  Laceration details:     Location:  Scalp    Scalp location:  Occipital    Length (cm):  4    Depth (mm):  5  Exploration:     Limited defect created (wound extended): no      Wound extent: no fascia violation noted, no foreign bodies/material noted and no muscle damage noted      Contaminated: no    Treatment:     Area cleansed with:  Soap and water    Amount of cleaning:  Standard    Irrigation solution:   Tap water    Visualized foreign bodies/material removed: no      Debridement:  None    Undermining:  None    Scar revision: no    Skin repair:     Repair method:  Staples    Number of staples:  4  Approximation:     Approximation:  Close  Repair type:     Repair type:  Simple  Post-procedure details:     Dressing:  Open (no dressing)    Procedure completion:  Tolerated well, no immediate complications        FINAL IMPRESSION      1. Scalp hematoma, initial encounter    2. Scalp laceration, initial encounter    3. Fall, initial encounter    4. Acute hip pain, left          DISPOSITION/PLAN     ED Disposition       ED Disposition   Discharge    Condition   Stable    Comment   --                 Comment: Please note this report has been produced using speech recognition software.      Rod Lopez MD  Attending Emergency Physician    No results found for this or any previous visit (from the past 24 hours).  Note: In addition to lab results from this visit, the labs listed above may include labs taken at another facility or during a different encounter within the last 24 hours. Please correlate lab times with ED admission and discharge times for further clarification of the services performed during this visit.                 Rod Lopez MD  11/02/24 9503